# Patient Record
Sex: FEMALE | Race: WHITE | NOT HISPANIC OR LATINO | Employment: OTHER | RURAL
[De-identification: names, ages, dates, MRNs, and addresses within clinical notes are randomized per-mention and may not be internally consistent; named-entity substitution may affect disease eponyms.]

---

## 2017-04-26 ENCOUNTER — OFFICE VISIT (OUTPATIENT)
Dept: CARDIAC SURGERY | Facility: CLINIC | Age: 82
End: 2017-04-26

## 2017-04-26 VITALS
BODY MASS INDEX: 24.99 KG/M2 | WEIGHT: 150 LBS | HEIGHT: 65 IN | HEART RATE: 54 BPM | SYSTOLIC BLOOD PRESSURE: 177 MMHG | DIASTOLIC BLOOD PRESSURE: 157 MMHG

## 2017-04-26 DIAGNOSIS — I73.9 PERIPHERAL VASCULAR DISEASE (HCC): Primary | ICD-10-CM

## 2017-04-26 PROCEDURE — 99212 OFFICE O/P EST SF 10 MIN: CPT | Performed by: THORACIC SURGERY (CARDIOTHORACIC VASCULAR SURGERY)

## 2017-04-26 RX ORDER — POTASSIUM CHLORIDE 1500 MG/1
20 TABLET, EXTENDED RELEASE ORAL
COMMUNITY
Start: 2017-04-25

## 2017-04-26 NOTE — PROGRESS NOTES
04/26/2017  Patient Information  Margoth Gipson                                                                                          [address] [facility] [address]   8/27/1925  'PCP/Referring Physician'  Evy Johnson-PRISCA Moody  [phone]  No ref. provider found    Chief Complaint   Patient presents with   • Follow-up     PT LAST SEEN IN 2016, CARRIE JOHNSON REFERRING BACK FOR SAME PROBLEM, PVD. PT STATES SHE IS HAVING SWELLING IN HER FEET AND PAIN IN HER LEFT LEG       History of Present Illness:   The patient is referred back at this time after seeing Natalya Johnson. She is having some pain in her left foot primarily. She is getting around with a walker. She is 89 years of age. She has no ulcer. She has no rest pain. She had recent arterial studies which revealed her ankle-brachial index on the right was 1.03. Her left was 0.86.      Patient Active Problem List   Diagnosis   • Peripheral vascular disease     Past Medical History:   Diagnosis Date   • Arthritis    • CHF (congestive heart failure)    • Hypertension    • Hyperthyroidism    • Hypothyroidism    • Raynaud's disease    • Skin cancer      Past Surgical History:   Procedure Laterality Date   • APPENDECTOMY     • BREAST LUMPECTOMY     • CARDIAC PACEMAKER PLACEMENT     • CATARACT EXTRACTION     • HYSTERECTOMY     • TONSILLECTOMY         Current Outpatient Prescriptions:   •  acetaminophen (TYLENOL) 500 MG tablet, Take 500 mg by mouth every 6 (six) hours as needed for mild pain (1-3)., Disp: , Rfl:   •  albuterol (PROAIR HFA) 108 (90 BASE) MCG/ACT inhaler, ProAir HFA AERS; Patient Sig: ProAir HFA AERS ; 0; 14-Apr-2016; Active, Disp: , Rfl:   •  aspirin 81 MG tablet, Take  by mouth., Disp: , Rfl:   •  atenolol (TENORMIN) 25 MG tablet, Take  by mouth., Disp: , Rfl:   •  budesonide-formoterol (SYMBICORT) 160-4.5 MCG/ACT inhaler, Symbicort AERO; Patient Sig: Symbicort AERO ; 0; 14-Apr-2016; Active, Disp: , Rfl:   •  CALCIUM PO, Take  by  mouth., Disp: , Rfl:   •  cilostazol (PLETAL) 50 MG tablet, , Disp: , Rfl:   •  Diphenhydramine-APAP, sleep, (TYLENOL PM EXTRA STRENGTH PO), Take  by mouth., Disp: , Rfl:   •  furosemide (LASIX) 40 MG tablet, Take  by mouth., Disp: , Rfl:   •  KLOR-CON 20 MEQ CR tablet, Take 20 mEq by mouth., Disp: , Rfl:   •  latanoprost (XALATAN) 0.005 % ophthalmic solution, Apply  to eye., Disp: , Rfl:   •  levofloxacin (LEVAQUIN) 500 MG tablet, Take  by mouth., Disp: , Rfl:   •  levothyroxine (SYNTHROID, LEVOTHROID) 50 MCG tablet, Take  by mouth., Disp: , Rfl:   •  Multiple Vitamin (MULTI VITAMIN DAILY) tablet, Take  by mouth., Disp: , Rfl:   •  polyethylene glycol (MIRALAX) powder, Take  by mouth., Disp: , Rfl:   •  SYMBICORT 80-4.5 MCG/ACT inhaler, , Disp: , Rfl:   •  vitamin C (ASCORBIC ACID) 500 MG tablet, Take  by mouth., Disp: , Rfl:   •  potassium chloride ER (K-TAB) 20 MEQ tablet controlled-release ER tablet, Take  by mouth., Disp: , Rfl:   No Known Allergies  Social History     Social History   • Marital status: Other     Spouse name: N/A   • Number of children: 1   • Years of education: N/A     Occupational History   • [employer] Retired     Social History Main Topics   • Smoking status: Never Smoker   • Smokeless tobacco: Never Used   • Alcohol use No   • Drug use: No   • Sexual activity: Not on file     Other Topics Concern   • Not on file     Social History Narrative    Lives in [place], [place] with son     Family History   Problem Relation Age of Onset   • Coronary artery disease Mother    • Aneurysm Father      of abdominal aorta     Review of Systems   Constitution: Negative for chills, fever, malaise/fatigue, night sweats and weight loss.   HENT: Negative for congestion, headaches, hearing loss, odynophagia and sore throat.    Eyes: Negative for blurred vision, double vision and pain.   Cardiovascular: Positive for leg swelling (MOSTLY IN THE FEET). Negative for chest pain, dyspnea on exertion,  "orthopnea and palpitations.   Respiratory: Negative for cough and hemoptysis.    Endocrine: Negative for cold intolerance, heat intolerance, polydipsia, polyphagia and polyuria.   Hematologic/Lymphatic: Does not bruise/bleed easily.   Skin: Negative for itching and rash.   Musculoskeletal: Positive for back pain. Negative for joint pain, joint swelling, muscle cramps, muscle weakness and myalgias.   Gastrointestinal: Positive for constipation. Negative for abdominal pain, diarrhea, hematemesis, hematochezia, melena, nausea and vomiting.   Genitourinary: Negative for dysuria, frequency and hematuria.   Neurological: Negative for dizziness, focal weakness, light-headedness, loss of balance, numbness, paresthesias and seizures.   Psychiatric/Behavioral: Negative for depression and suicidal ideas. The patient is not nervous/anxious.    All other systems reviewed and are negative.    Vitals:    04/26/17 1039   BP: (!) 177/157   BP Location: Left arm   Patient Position: Sitting   Pulse: 54   Weight: 150 lb (68 kg)   Height: 65\" (165.1 cm)      Physical Exam   Neck: Normal range of motion. Neck supple. No JVD present. No tracheal deviation present. No thyromegaly present.   Cardiovascular: Normal rate and regular rhythm.  Exam reveals no gallop and no friction rub.    No murmur heard.  Pulses:       Dorsalis pedis pulses are 2+ on the right side, and 1+ on the left side.        Posterior tibial pulses are 1+ on the right side, and 0 on the left side.   Pulmonary/Chest: No stridor. No respiratory distress. She has no wheezes. She has no rales. She exhibits no tenderness.   Abdominal: Soft. There is no tenderness.   Lymphadenopathy:     She has no cervical adenopathy.     Assessment/Plan:   The patient has obvious peripheral vascular disease. She has no ulceration. She has no rest pain. She is 89 years of age. She has good capillary refill. She is getting around with a walker. I had a long discussion with both her and her " son who was in attendance. At this point, I will continue conservative measures. If she should get a ulceration I would be happy to see her immediately and consider angiography with possible intervention. However in this patient with no other problems at this point I think that the risk in a 89-year-old outweighs the benefits. At this point, she desires continued conservative measures. I have encouraged her follow closely with Natalya Johnson. I will see her as needed.         Patient Active Problem List   Diagnosis   • Peripheral vascular disease     Signed by: Mason Carlton M.D.    4/26/2017    CC:  ANDREINA Jacques, , editing for Mason Carlton M.D.    I, Mason Carlton MD, have read and agree with the editing done by Val Simsm, .